# Patient Record
(demographics unavailable — no encounter records)

---

## 2024-10-22 NOTE — DISCUSSION/SUMMARY
[FreeTextEntry1] : 16 year old male presents to clinic for vaccine administration. 1. Encounter for immunizations -The following vaccines were administered without difficulty: Hepatitis A, HPV, and Influenza. -Advised patient to apply cool compress or ice pack to arm if having pain, and use of OTC fever reducing agents such as Tylenol or Ibuprofen if he develops fever. -Updated copy of CIR provided to patient.  2.  -[facility] performed and reviewed with patient. No positive indicators noted. Anticipatory guidance provided.  Pt will RTC on 10/25/2024 to continue vaccine schedule.  [] : The components of the vaccine(s) to be administered today are listed in the plan of care. The disease(s) for which the vaccine(s) are intended to prevent and the risks have been discussed with the caretaker.  The risks are also included in the appropriate vaccination information statements which have been provided to the patient's caregiver.  The caregiver has given consent to vaccinate.

## 2024-10-22 NOTE — RISK ASSESSMENT
[Has family members/adults to turn to for help] : has family members/adults to turn to for help [Is permitted and is able to make independent decisions] : Is permitted and is able to make independent decisions [Grade: ____] : Grade: [unfilled] [Eats regular meals including adequate fruits and vegetables] : eats regular meals including adequate fruits and vegetables [Drinks non-sweetened liquids] : drinks non-sweetened liquids  [Calcium source] : calcium source [Has friends] : has friends [At least 1 hour of physical activity a day] : at least 1 hour of physical activity a day [Screen time (except homework) less than 2 hours a day] : screen time (except homework) less than 2 hours a day [Has interests/participates in community activities/volunteers] : has interests/participates in community activities/volunteers [Home is free of violence] : home is free of violence [Has peer relationships free of violence] : has peer relationships free of violence [Uses safety belts/safety equipment] : uses safety belts/safety equipment  [Has/had oral sex] : has/had oral sex [Has had sexual intercourse] : has had sexual intercourse [Has ways to cope with stress] : has ways to cope with stress [Displays self-confidence] : displays self-confidence [Gets depressed, anxious, or irritable/has mood swings] : gets depressed, anxious, or irritable/has mood swings [With Teen] : teen [Eats meals with family] : does not eat meals with family [Has concerns about body or appearance] : does not have concerns about body or appearance [Uses tobacco] : does not use tobacco [Uses drugs] : does not use drugs  [Drinks alcohol] : does not drink alcohol [Impaired/distracted driving] : no impaired/distracted driving [Has problems with sleep] : does not have problems with sleep [Has thought about hurting self or considered suicide] : has not thought about hurting self or considered suicide [de-identified] : Lives with father and 2 brothers; Arrived from  1 year ago [de-identified] : [employer]; needs to improve grades in 1-2 classes [de-identified] : Enjoys working out at the gym [de-identified] : Identifies as male; Interested in females only; Currently not in a relationship; Last SA: 1 year ago-only oral sex; # of sexual partners in his lifetime: 3-4 [de-identified] : Tries to control his stress levels by sleeping and working out

## 2024-10-22 NOTE — BEGINNING OF VISIT
[Patient] : patient [] :  [[employer] ] : provided by [employer]   [Time Spent: ____ minutes] : Total time spent using  services: [unfilled] minutes. The patient's primary language is not English thus required  services. [Interpreters_IDNumber] : [id number] [Interpreters_FullName] : Ivory [TWNoteComboBox1] : [place]

## 2024-10-22 NOTE — HISTORY OF PRESENT ILLNESS
[FreeTextEntry6] : 16 year old male presents to clinic today for vaccine administration. CIR reviewed, pt due for multiple vaccines. Consent signed by father on chart for patient to receive Hepatitis A, HPV, and Influenza vaccines today. Pt denies any adverse reactions to vaccines in the past. Pt feels well, denies any s/s of illness at present.

## 2024-10-25 NOTE — DISCUSSION/SUMMARY
[FreeTextEntry1] : 16 year old male presents for MCV4 vaccine administration Student tolerated vaccine administration well without incident Reviewed possible injection site tenderness; patient may apply cool compress or ice pack for tenderness Fever may develop within 24 hours, may utilize ibuprofen or Tylenol If fever persists past 24 hours may need to seek care for possible infection unrelated to vaccine  Return to clinic as needed for any further complaints [] : The components of the vaccine(s) to be administered today are listed in the plan of care. The disease(s) for which the vaccine(s) are intended to prevent and the risks have been discussed with the caretaker.  The risks are also included in the appropriate vaccination information statements which have been provided to the patient's caregiver.  The caregiver has given consent to vaccinate.

## 2024-10-25 NOTE — HISTORY OF PRESENT ILLNESS
[Meningococcal ACWY] : Meningococcal ACWY [FreeTextEntry1] : 16 year old male presents to clinic for vaccine administration. Feels well today. No complaints of illness at present CIR reviewed, student due for the following vaccines: MCV4 Parental consent signed for student to receive MCV4 Student denies any adverse events to vaccines in the past.